# Patient Record
Sex: MALE | URBAN - METROPOLITAN AREA
[De-identification: names, ages, dates, MRNs, and addresses within clinical notes are randomized per-mention and may not be internally consistent; named-entity substitution may affect disease eponyms.]

---

## 2020-06-01 ENCOUNTER — APPOINTMENT (RX ONLY)
Dept: URBAN - METROPOLITAN AREA CLINIC 107 | Facility: CLINIC | Age: 45
Setting detail: DERMATOLOGY
End: 2020-06-01

## 2020-06-01 DIAGNOSIS — D49.2 NEOPLASM OF UNSPECIFIED BEHAVIOR OF BONE, SOFT TISSUE, AND SKIN: ICD-10-CM

## 2020-06-01 PROCEDURE — ? ADDITIONAL NOTES

## 2020-06-01 PROCEDURE — ? COUNSELING

## 2020-06-01 NOTE — PROCEDURE: MIPS QUALITY
Additional Notes: —\\n\\n1. What platform is being used for the telehealth visit? \\n- Zoom\\n\\n2. Were there any technical issues during the visit? (Dropped, low resolution video etc.)\\n- None\\n\\n3.  Is there anyone else there present with the patient and what is their relationship?\\n- No\\n\\n4. What was the cumulative time spent? (setting up the visit and with patient)\\n- 20 minutes \\n\\n—
Quality 110: Preventive Care And Screening: Influenza Immunization: Influenza Immunization Administered during Influenza season
Detail Level: Detailed

## 2020-06-01 NOTE — PROCEDURE: ADDITIONAL NOTES
Additional Notes: >>———————————————————————————————————————————<<\\n\\nVerbal consent for telemedicine encounter obtained from patient prior to visit on 5/29/2020.\\n\\n>>———————————————————————————————————————————<<\\n\\n\\n**New pt presenting for spot of concern to hip.**\\n\\n- Discussed likely etiology and treatment options \\n\\nRTC
Detail Level: Simple

## 2020-06-03 ENCOUNTER — APPOINTMENT (RX ONLY)
Dept: URBAN - METROPOLITAN AREA CLINIC 107 | Facility: CLINIC | Age: 45
Setting detail: DERMATOLOGY
End: 2020-06-03

## 2020-06-03 DIAGNOSIS — D17 BENIGN LIPOMATOUS NEOPLASM: ICD-10-CM

## 2020-06-03 PROBLEM — D17.30 BENIGN LIPOMATOUS NEOPLASM OF SKIN AND SUBCUTANEOUS TISSUE OF UNSPECIFIED SITES: Status: ACTIVE | Noted: 2020-06-03

## 2020-06-03 PROCEDURE — ? COUNSELING

## 2020-06-03 PROCEDURE — 99212 OFFICE O/P EST SF 10 MIN: CPT | Mod: 95

## 2020-06-03 PROCEDURE — ? ADDITIONAL NOTES

## 2020-06-03 NOTE — PROCEDURE: MIPS QUALITY
Additional Notes: —\\n\\n1. What platform is being used for the telehealth visit? \\n- FaceTime \\n\\n2. Were there any technical issues during the visit? (Dropped, low resolution video etc.)\\n- None\\n\\n3.  Is there anyone else there present with the patient and what is their relationship?\\n- No\\n\\n4. What was the cumulative time spent? (setting up the visit and with patient)\\n- 20 minutes \\n\\n—
Quality 110: Preventive Care And Screening: Influenza Immunization: Influenza Immunization Administered during Influenza season
Detail Level: Detailed

## 2020-06-03 NOTE — PROCEDURE: ADDITIONAL NOTES
Detail Level: Simple
Additional Notes: >>———————————————————————————————————————————<<\\n\\nVerbal consent for telemedicine encounter obtained from patient prior to visit on 6/2/2020.\\n\\n>>———————————————————————————————————————————<<\\n\\n**Likely lipoma to R hip > 15 cm.**\\n\\n- Discussed etiology and treament options of monitoring vs surgery (with risk of recurrence, scar, infection) vs liposuction/cool sculpting \\n- Literature sent to email\\n- If pt would like surgery, will schedule US to r/o underlying attachment and schedule with [facility]\\n    > pt aware to email me if he does chose surgery

## 2020-06-06 ENCOUNTER — APPOINTMENT (RX ONLY)
Dept: URBAN - METROPOLITAN AREA CLINIC 107 | Facility: CLINIC | Age: 45
Setting detail: DERMATOLOGY
End: 2020-06-06

## 2020-06-06 DIAGNOSIS — D17 BENIGN LIPOMATOUS NEOPLASM: ICD-10-CM

## 2020-06-06 PROBLEM — D17.30 BENIGN LIPOMATOUS NEOPLASM OF SKIN AND SUBCUTANEOUS TISSUE OF UNSPECIFIED SITES: Status: ACTIVE | Noted: 2020-06-06

## 2020-06-06 PROCEDURE — ? ORDER ULTRASOUND

## 2020-06-06 PROCEDURE — ? ADDITIONAL NOTES

## 2020-06-06 NOTE — PROCEDURE: ADDITIONAL NOTES
Additional Notes: **Likely lipoma to R hip > 15 cm.**\\n\\n- Discussed etiology and treament options of monitoring vs surgery (with risk of recurrence, scar, infection) vs liposuction/cool sculpting \\n- Literature emailed to pt on 6/3/2020\\n- Pt would like surgery, will order US to r/o underlying attachment and schedule with [facility] vs Plastics
Detail Level: Simple

## 2020-06-06 NOTE — PROCEDURE: ORDER ULTRASOUND
Ultrasound Protocol: Ultrasound of Abdomen
Detail Level: Simple
Abdomen Ultrasound Reason: To r/o underlying attachment
Perform At: [facility]
Provider: Karla Power MD
Priority: normal